# Patient Record
Sex: MALE | URBAN - NONMETROPOLITAN AREA
[De-identification: names, ages, dates, MRNs, and addresses within clinical notes are randomized per-mention and may not be internally consistent; named-entity substitution may affect disease eponyms.]

---

## 2020-08-29 ENCOUNTER — HISTORICAL (OUTPATIENT)
Dept: ADMINISTRATIVE | Facility: HOSPITAL | Age: 65
End: 2020-08-29

## 2022-09-19 DIAGNOSIS — D72.829 ELEVATED WHITE BLOOD CELL COUNT, UNSPECIFIED: Primary | ICD-10-CM

## 2022-11-08 DIAGNOSIS — D72.829 ELEVATED WHITE BLOOD CELL COUNT, UNSPECIFIED: Primary | ICD-10-CM

## 2022-11-09 ENCOUNTER — OFFICE VISIT (OUTPATIENT)
Dept: UROLOGY | Facility: CLINIC | Age: 67
End: 2022-11-09
Payer: MEDICARE

## 2022-11-09 VITALS — HEIGHT: 69 IN | BODY MASS INDEX: 45.91 KG/M2 | RESPIRATION RATE: 20 BRPM | WEIGHT: 310 LBS

## 2022-11-09 DIAGNOSIS — D72.829 ELEVATED WHITE BLOOD CELL COUNT, UNSPECIFIED: ICD-10-CM

## 2022-11-09 DIAGNOSIS — N50.812 PAIN IN LEFT TESTICLE: ICD-10-CM

## 2022-11-09 DIAGNOSIS — R35.1 NOCTURIA: ICD-10-CM

## 2022-11-09 DIAGNOSIS — R97.20 ELEVATED PSA: Primary | ICD-10-CM

## 2022-11-09 LAB — POC RESIDUAL URINE VOLUME: 16 ML (ref 0–100)

## 2022-11-09 PROCEDURE — 51798 US URINE CAPACITY MEASURE: CPT | Mod: S$GLB,,, | Performed by: NURSE PRACTITIONER

## 2022-11-09 PROCEDURE — 99204 OFFICE O/P NEW MOD 45 MIN: CPT | Mod: S$GLB,,, | Performed by: NURSE PRACTITIONER

## 2022-11-09 PROCEDURE — 51798 POCT BLADDER SCAN: ICD-10-PCS | Mod: S$GLB,,, | Performed by: NURSE PRACTITIONER

## 2022-11-09 PROCEDURE — 99204 PR OFFICE/OUTPT VISIT, NEW, LEVL IV, 45-59 MIN: ICD-10-PCS | Mod: S$GLB,,, | Performed by: NURSE PRACTITIONER

## 2022-11-09 RX ORDER — ASPIRIN 81 MG/1
81 TABLET ORAL DAILY
COMMUNITY

## 2022-11-09 RX ORDER — ROSUVASTATIN CALCIUM 40 MG/1
40 TABLET, COATED ORAL NIGHTLY
COMMUNITY
Start: 2022-09-29

## 2022-11-09 RX ORDER — FUROSEMIDE 20 MG/1
20 TABLET ORAL DAILY
COMMUNITY
Start: 2022-09-06

## 2022-11-09 RX ORDER — METOPROLOL TARTRATE 25 MG/1
25 TABLET, FILM COATED ORAL 2 TIMES DAILY
COMMUNITY
Start: 2022-10-01

## 2022-11-09 RX ORDER — LOSARTAN POTASSIUM 100 MG/1
100 TABLET ORAL DAILY
COMMUNITY
Start: 2022-07-03

## 2022-11-09 RX ORDER — HYDROXYCHLOROQUINE SULFATE 200 MG/1
200 TABLET, FILM COATED ORAL 2 TIMES DAILY
COMMUNITY
Start: 2022-10-21

## 2022-11-09 RX ORDER — TAMSULOSIN HYDROCHLORIDE 0.4 MG/1
CAPSULE ORAL
COMMUNITY
Start: 2022-09-29

## 2022-11-09 RX ORDER — AMLODIPINE BESYLATE 10 MG/1
TABLET ORAL
COMMUNITY
Start: 2022-11-08

## 2022-11-09 RX ORDER — TICAGRELOR 90 MG/1
90 TABLET ORAL 2 TIMES DAILY
COMMUNITY
Start: 2022-10-16 | End: 2023-03-29

## 2022-11-09 NOTE — PROGRESS NOTES
"Subjective:       Patient ID: Evens Briseno is a 67 y.o. male.    Chief Complaint: New Pt and Elevated PSA      HPI: 67-year-old male, new to [facility], referred for an elevated PSA.    Patient's PSA on 09/14/2022.  PSA at that time was 4.74.    Patient denies any history of elevated PSAs.  Denies any family history of prostate cancer.      Patient denies any pain or burning urination.  Does have occasional nocturia.    Denies having strain to void.  Denies any significant frequency urgency.  Denies any leakage.  Denies any blood in urine.  Denies any fever or body aches.  Denies any significant weight loss.  Denies any onset bone pain.      Patient does complain of left testicular pain.  Patient states with this been going on for "years".  Pain can be mild to moderate.    No other urinary complaints at this time.       Past Medical History:   Past Medical History:   Diagnosis Date    BPH with obstruction/lower urinary tract symptoms     Elevated PSA     HLD (hyperlipidemia)     HTN (hypertension)     MI (myocardial infarction)     Skin cancer        Past Surgical Historical:   Past Surgical History:   Procedure Laterality Date    PERIPHERAL ARTERIAL STENT GRAFT      WISDOM TOOTH EXTRACTION          Medications:   Medication List with Changes/Refills   Current Medications    AMLODIPINE (NORVASC) 10 MG TABLET        ASPIRIN (ECOTRIN) 81 MG EC TABLET    Take 81 mg by mouth once daily.    BRILINTA 90 MG TABLET    Take 90 mg by mouth 2 (two) times daily.    FUROSEMIDE (LASIX) 20 MG TABLET    Take 20 mg by mouth once daily.    HYDROXYCHLOROQUINE (PLAQUENIL) 200 MG TABLET    Take 200 mg by mouth 2 (two) times daily.    LOSARTAN (COZAAR) 100 MG TABLET    Take 100 mg by mouth once daily.    METOPROLOL TARTRATE (LOPRESSOR) 25 MG TABLET    Take 25 mg by mouth 2 (two) times daily.    ROSUVASTATIN (CRESTOR) 40 MG TAB    Take 40 mg by mouth every evening.    TAMSULOSIN (FLOMAX) 0.4 MG CAP    TAKE 1 CAPSULE BY MOUTH ONCE " A DAY TAKE 1 CAPSULE BY MOUTH EVERY DAY        Past Social History:   Social History     Socioeconomic History    Marital status: Unknown   Tobacco Use    Smoking status: Every Day     Types: Cigarettes    Smokeless tobacco: Never   Substance and Sexual Activity    Alcohol use: Not Currently       Allergies: Review of patient's allergies indicates:  No Known Allergies     Family History: History reviewed. No pertinent family history.     Review of Systems:  Review of Systems   Constitutional:  Negative for activity change and appetite change.   HENT:  Negative for congestion and dental problem.    Eyes:  Negative for visual disturbance.   Respiratory:  Negative for chest tightness and shortness of breath.    Cardiovascular:  Negative for chest pain.   Gastrointestinal:  Negative for abdominal distention and abdominal pain.   Genitourinary:  Positive for testicular pain. Negative for decreased urine volume, difficulty urinating, dysuria, enuresis, flank pain, frequency, genital sores, hematuria, penile discharge, penile pain, penile swelling, scrotal swelling and urgency.   Musculoskeletal:  Negative for back pain and neck pain.   Skin:  Negative for color change.   Neurological:  Negative for dizziness.   Hematological:  Negative for adenopathy.   Psychiatric/Behavioral:  Negative for agitation, behavioral problems and confusion.      Physical Exam:  Physical Exam  Vitals and nursing note reviewed.   Constitutional:       Appearance: He is well-developed.   HENT:      Head: Normocephalic.   Eyes:      Pupils: Pupils are equal, round, and reactive to light.   Cardiovascular:      Rate and Rhythm: Normal rate and regular rhythm.      Heart sounds: Normal heart sounds.   Pulmonary:      Effort: Pulmonary effort is normal.      Breath sounds: Normal breath sounds.   Abdominal:      General: Bowel sounds are normal.      Palpations: Abdomen is soft.   Genitourinary:     Penis: Normal.       Testes:         Left: Swelling  present.      Prostate: Normal.      Rectum: Normal.      Comments: Prostate is benign.  Prostate smooth no nodules and nontender.  Prostate is symmetrical.    Mild edema noted to left testicle.  Likely hydrocele.  Musculoskeletal:         General: Normal range of motion.      Cervical back: Normal range of motion and neck supple.   Skin:     General: Skin is warm and dry.   Neurological:      Mental Status: He is alert and oriented to person, place, and time.   Psychiatric:         Behavior: Behavior normal.     Bladder scan: 16 cc    Assessment/Plan:   1. Elevated PSA:  Will check the patient's PSA.  We will notify him of the results.    If PSA is still elevated will recommend biopsy.    If PSA has decrease into the normal range will plan to repeat at next visit.      2. Nocturia:  Patient has approximately 1-2 episodes of nocturia per night.    He does admit to drinking two 32 oz of sweet tea per day.    We discussed the effects of tea, soda, and coffee.  Patient will try to decrease his tea consumption.      3. Pain in left testicle:  Will schedule patient for ultrasound for further evaluation.      Follow-up to arrange.  Problem List Items Addressed This Visit    None  Visit Diagnoses       Elevated PSA    -  Primary    Relevant Orders    Prostate Specific Antigen, Diagnostic    Nocturia        Pain in left testicle        Relevant Orders    US Scrotum And Testicles

## 2022-11-10 LAB — PSA, DIAGNOSTIC: 4.85 NG/ML (ref 0–4)

## 2022-11-11 ENCOUNTER — TELEPHONE (OUTPATIENT)
Dept: UROLOGY | Facility: CLINIC | Age: 67
End: 2022-11-11
Payer: MEDICARE

## 2022-11-11 NOTE — TELEPHONE ENCOUNTER
----- Message from Matti Vasquez NP sent at 11/10/2022 12:19 PM CST -----  PSA is elevated and slightly higher than when checked by his PCP.  Recommend biopsy.

## 2022-11-11 NOTE — TELEPHONE ENCOUNTER
Notified pt of results and that provider recommended a bx. Pt stated they would like to follow thru with the bx. Notified them that they would hear something from [facility] once we have insurance authorization. ED

## 2022-12-09 ENCOUNTER — TELEPHONE (OUTPATIENT)
Dept: UROLOGY | Facility: CLINIC | Age: 67
End: 2022-12-09
Payer: MEDICARE

## 2022-12-09 DIAGNOSIS — R97.20 ELEVATED PSA: Primary | ICD-10-CM

## 2022-12-09 NOTE — TELEPHONE ENCOUNTER
Pt called with questions regarding biopsy. All questions were answered. I have placed order for biopsy.

## 2022-12-13 ENCOUNTER — TELEPHONE (OUTPATIENT)
Dept: UROLOGY | Facility: CLINIC | Age: 67
End: 2022-12-13
Payer: MEDICARE

## 2022-12-13 NOTE — TELEPHONE ENCOUNTER
----- Message from Matti Vasquez NP sent at 12/8/2022  4:39 PM CST -----  Ultrasound shows small left hydrocele and small bilateral epididymal head cysts.  Also a small simple cyst in the right testicle.  All benign findings.  Patient encouraged to wear supportive underwear.      Follow-up in 6 months, sooner if needed.

## 2022-12-15 ENCOUNTER — TELEPHONE (OUTPATIENT)
Dept: UROLOGY | Facility: CLINIC | Age: 67
End: 2022-12-15
Payer: MEDICARE

## 2022-12-16 NOTE — TELEPHONE ENCOUNTER
Pt had questions regarding biopsy. All questions answered. Advised pt to write down anything he can think of and we can answer for him when he comes for consents/education. Pt verbalized understanding.     ----- Message from Darby Chino sent at 12/16/2022 10:00 AM CST -----  Pt has finally set up bx but has some questions and would like a call back from a nurse

## 2023-01-27 DIAGNOSIS — R97.20 ELEVATED PSA: Primary | ICD-10-CM

## 2023-01-27 RX ORDER — CIPROFLOXACIN 500 MG/1
500 TABLET ORAL 2 TIMES DAILY
Qty: 8 TABLET | Refills: 0 | Status: SHIPPED | OUTPATIENT
Start: 2023-01-27 | End: 2023-01-31

## 2023-01-27 RX ORDER — DIAZEPAM 10 MG/1
TABLET ORAL
Qty: 1 TABLET | Refills: 0 | Status: SHIPPED | OUTPATIENT
Start: 2023-01-27

## 2023-01-30 ENCOUNTER — CLINICAL SUPPORT (OUTPATIENT)
Dept: UROLOGY | Facility: CLINIC | Age: 68
End: 2023-01-30
Payer: MEDICARE

## 2023-01-30 DIAGNOSIS — R97.20 ELEVATED PSA: Primary | ICD-10-CM

## 2023-01-30 NOTE — PROGRESS NOTES
Patient educated, consent signed, pre-admission paperwork given. Patient verbalized understanding. DOS 2/14/23 cipro and valium script given to patient. Patient will need cardiac clearance from vilma leigh NP. Radha notified. Patient aware if we do not receive clearance we will have to postpone procedure.  [facility]

## 2023-02-13 ENCOUNTER — TELEPHONE (OUTPATIENT)
Dept: UROLOGY | Facility: CLINIC | Age: 68
End: 2023-02-13
Payer: MEDICARE

## 2023-02-13 NOTE — TELEPHONE ENCOUNTER
Appointment confirmed. Arrival time of 9:30 AM given. Instructed to take valium 30 minutes prior to arrival, begin cipro today, administer enema 2 hrs prior to procedure & to have a  with him. Verbalized understanding.     ZHAO Vazquez RN

## 2023-02-14 ENCOUNTER — PROCEDURE VISIT (OUTPATIENT)
Dept: UROLOGY | Facility: CLINIC | Age: 68
End: 2023-02-14
Payer: MEDICARE

## 2023-02-14 VITALS
WEIGHT: 305.13 LBS | HEART RATE: 71 BPM | BODY MASS INDEX: 45.19 KG/M2 | HEIGHT: 69 IN | RESPIRATION RATE: 20 BRPM | SYSTOLIC BLOOD PRESSURE: 128 MMHG | DIASTOLIC BLOOD PRESSURE: 57 MMHG | OXYGEN SATURATION: 95 %

## 2023-02-14 DIAGNOSIS — R97.20 ELEVATED PSA: ICD-10-CM

## 2023-02-14 PROCEDURE — 76872 TRANSRECTAL ULTRASOUND W/ BIOPSY: ICD-10-PCS | Mod: S$GLB,,, | Performed by: UROLOGY

## 2023-02-14 PROCEDURE — 55700 TRANSRECTAL ULTRASOUND W/ BIOPSY: CPT | Mod: S$GLB,,, | Performed by: UROLOGY

## 2023-02-14 PROCEDURE — 76872 US TRANSRECTAL: CPT | Mod: S$GLB,,, | Performed by: UROLOGY

## 2023-02-14 PROCEDURE — 55700 TRANSRECTAL ULTRASOUND W/ BIOPSY: ICD-10-PCS | Mod: S$GLB,,, | Performed by: UROLOGY

## 2023-02-14 NOTE — PROCEDURES
"Transrectal Ultrasound w/ Biopsy    Date/Time: 2/14/2023 10:00 AM  Performed by: Vitaliy Swenson MD  Authorized by: Vitaliy Swenson MD     Consent Done?:  Yes (Written)  Time out: Immediately prior to procedure a "time out" was called to verify the correct patient, procedure, equipment, support staff and site/side marked as required.    Indications: Elevated PSA    Position:  Left lateral  Anesthesia:  Pudendal nerve block  Patient sedated: No    Prostate Size:  118g  Left Base Biopsies: 2  Left Mid Biopsies: 2  Left Center Biopsies: 2  Right Base Biopsies: 2  Right Mid Biopsies: 2  Right Center Biopsies: 2  Total Biopsies:  12    Patient tolerance:  Patient tolerated the procedure well with no immediate complications     Patient was brought to the procedure room placed on the table in left lateral decubitus position lidocaine jelly was instilled into the rectum the ultrasound probe was advanced to level of prostate and a total of 10 cc of lidocaine was injected into the bilateral kalani prostatic fossa.  A standard 12 core prostate biopsy was obtained patient tolerated the procedure well there were no complications  "

## 2023-02-14 NOTE — PATIENT INSTRUCTIONS
NO STRENUOUS ACTIVITY FOR 3 DAYS  NO SEXUAL INTERCOURSE FOR 7 DAYS  YOU MAY NOTICE BLOOD IN URINE OR SEMEN FOR SEVERAL DAYS                What to Expect After a Prostate Biopsy    Please be sure to finish your pre-procedure antibiotics as instructed.    You may have mild bleeding from the rectum or urine for about 1 week to 1 month, or in your ejaculate for several months. This bleeding is normal and expected, and it will stop. You may have mild discomfort in your rectal or urethral area for 24-48 hours.    You cannot do any strenuous lifting, straining, or exercising for 24 hours. You may return to full activity the day after the biopsy.    You may continue to take all your regular medications after the procedure except for the blood thinners.    You may resume all blood-thinning medications once you no longer see any bleeding or whenever your physician prescribing the medication says it is all right to do so. You may take Tylenol if you have a fever and your temperature is less than 100° F or if you have some discomfort.    You will receive a call from the Urology Department at [facility] with the results of your prostate biopsy within one week.    Signs and Symptoms to Report    Call your [facility] urologist at [phone] if you develop any of the following:  Temperature greater than 101°  F  Inability to urinate  A large amount of bleeding from the rectum or in the urine  Persistent or severe pain    After hours or on weekends, you may reach a urology resident on call at this number: [phone].

## 2023-02-27 ENCOUNTER — TELEPHONE (OUTPATIENT)
Dept: UROLOGY | Facility: CLINIC | Age: 68
End: 2023-02-27
Payer: MEDICARE

## 2023-03-28 ENCOUNTER — TELEPHONE (OUTPATIENT)
Dept: UROLOGY | Facility: CLINIC | Age: 68
End: 2023-03-28
Payer: MEDICARE

## 2023-03-28 NOTE — TELEPHONE ENCOUNTER
----- Message from Matti Vasquez NP sent at 3/27/2023  4:17 PM CDT -----  Contact: self  He can follow up in 3 months or sooner if needed.    ----- Message -----  From: Kristen Moore LPN  Sent: 3/27/2023   4:08 PM CDT  To: Matti Vasquez NP    Both actually. He has only been seen by you once for elevated PSA and a biopsy with Dr. Swenson.   ----- Message -----  From: Matti Vasquez NP  Sent: 3/27/2023   4:00 PM CDT  To: Kristen Moore LPN    When you say us, do you mean the only time he saw Dr. Swenson was for a biopsy.  Or do you mean us as in the only time he was seen in the Urology Clinic was for his elevated PSA?    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 How would you like to proceed?  ----- Message -----  From: Dayanna Hicks  Sent: 3/27/2023  11:16 AM CDT  To: Messi Burks Staff    Requesting a call back regarding getting a release from Dr Swenson - his GP wants to do some hormone shots and needed to make sure it'd be okay with Dr Swenson. Please call back at [phone]

## 2023-03-28 NOTE — TELEPHONE ENCOUNTER
Discussed call with Jessie in person, he recommends getting pt in for appt to discuss testosterone and the risk for prostatae cancer. Informed pt and scheduled him for tomorrow in [place]. There was an opening and pt agreed with date/time and was aware appt is in [place]. [facility]

## 2023-03-29 ENCOUNTER — OFFICE VISIT (OUTPATIENT)
Dept: UROLOGY | Facility: CLINIC | Age: 68
End: 2023-03-29
Payer: MEDICARE

## 2023-03-29 DIAGNOSIS — N40.1 BPH WITH URINARY OBSTRUCTION: ICD-10-CM

## 2023-03-29 DIAGNOSIS — R97.20 ELEVATED PSA: Primary | ICD-10-CM

## 2023-03-29 DIAGNOSIS — N13.8 BPH WITH URINARY OBSTRUCTION: ICD-10-CM

## 2023-03-29 DIAGNOSIS — R33.9 INCOMPLETE BLADDER EMPTYING: ICD-10-CM

## 2023-03-29 DIAGNOSIS — R53.83 FATIGUE, UNSPECIFIED TYPE: ICD-10-CM

## 2023-03-29 PROCEDURE — 99214 PR OFFICE/OUTPT VISIT, EST, LEVL IV, 30-39 MIN: ICD-10-PCS | Mod: S$GLB,,, | Performed by: NURSE PRACTITIONER

## 2023-03-29 PROCEDURE — 99214 OFFICE O/P EST MOD 30 MIN: CPT | Mod: S$GLB,,, | Performed by: NURSE PRACTITIONER

## 2023-03-29 NOTE — PROGRESS NOTES
Subjective:       Patient ID: Evens Briseno is a 67 y.o. male.    Chief Complaint: discuss testosterone      HPI: 67-year-old male, established patient, presents for follow-up prostate biopsy.    Patient a PSA of 4.85.  He underwent a biopsy on 02/14/2023.  All 12 samples were benign.    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    Patient has history BPH with obstruction.  He is maintained on Flomax 0.4 mg daily.  Patient denies any pain or burning urination.  Denies any difficulty voiding.  States he is a pretty good stream from start to finish.  Says he may have episodes where after voiding he will have to go back and void again.  Denies any significant urgency or nocturia.  Does admit to drinking a lot of tea.  Denies coffee or soda use.      Patient reports he has low testosterone.  States his PCP checked his testosterone and it is low with recommendation of testosterone treatment.    Patient admits he does have low energy level.  He also has low libido and erectile dysfunction.      No blood in urine.  Denies significant weight loss.  No new onset bone pain.    No other urinary complaints this time.       Past Medical History:   Past Medical History:   Diagnosis Date    BPH with obstruction/lower urinary tract symptoms     Elevated PSA     HLD (hyperlipidemia)     HTN (hypertension)     MI (myocardial infarction)     Skin cancer        Past Surgical Historical:   Past Surgical History:   Procedure Laterality Date    PERIPHERAL ARTERIAL STENT GRAFT      WISDOM TOOTH EXTRACTION          Medications:   Medication List with Changes/Refills   Current Medications    AMLODIPINE (NORVASC) 10 MG TABLET        ASPIRIN (ECOTRIN) 81 MG EC TABLET    Take 81 mg by mouth once daily.    DIAZEPAM (VALIUM) 10 MG TAB    TAKE 1 TABLET PO 30 MINUTES PRIOR TO ARRIVAL FOR PROCEDURE    FUROSEMIDE (LASIX) 20 MG TABLET    Take 20 mg by mouth once daily.    HYDROXYCHLOROQUINE (PLAQUENIL) 200 MG TABLET    Take 200 mg by mouth 2 (two) times daily.     LOSARTAN (COZAAR) 100 MG TABLET    Take 100 mg by mouth once daily.    METOPROLOL TARTRATE (LOPRESSOR) 25 MG TABLET    Take 25 mg by mouth 2 (two) times daily.    ROSUVASTATIN (CRESTOR) 40 MG TAB    Take 40 mg by mouth every evening.    TAMSULOSIN (FLOMAX) 0.4 MG CAP    TAKE 1 CAPSULE BY MOUTH ONCE A DAY TAKE 1 CAPSULE BY MOUTH EVERY DAY   Discontinued Medications    BRILINTA 90 MG TABLET    Take 90 mg by mouth 2 (two) times daily.        Past Social History:   Social History     Socioeconomic History    Marital status: Unknown   Tobacco Use    Smoking status: Every Day     Types: Cigarettes    Smokeless tobacco: Never   Substance and Sexual Activity    Alcohol use: Not Currently       Allergies: Review of patient's allergies indicates:  No Known Allergies     Family History: History reviewed. No pertinent family history.     Review of Systems:  Review of Systems   Constitutional:  Negative for activity change and appetite change.   HENT:  Negative for congestion and dental problem.    Eyes:  Negative for visual disturbance.   Respiratory:  Negative for chest tightness and shortness of breath.    Cardiovascular:  Negative for chest pain.   Gastrointestinal:  Negative for abdominal distention and abdominal pain.   Genitourinary:  Negative for decreased urine volume, difficulty urinating, dysuria, enuresis, flank pain, frequency, genital sores, hematuria, penile discharge, penile pain, penile swelling, scrotal swelling, testicular pain and urgency.   Musculoskeletal:  Negative for back pain and neck pain.   Skin:  Negative for color change.   Neurological:  Negative for dizziness.   Hematological:  Negative for adenopathy.   Psychiatric/Behavioral:  Negative for agitation, behavioral problems and confusion.      Physical Exam:  Physical Exam  Vitals and nursing note reviewed.   Constitutional:       Appearance: He is well-developed.   HENT:      Head: Normocephalic.   Eyes:      Pupils: Pupils are equal, round, and  reactive to light.   Cardiovascular:      Rate and Rhythm: Normal rate and regular rhythm.      Heart sounds: Normal heart sounds.   Pulmonary:      Effort: Pulmonary effort is normal.      Breath sounds: Normal breath sounds.   Abdominal:      General: Bowel sounds are normal.      Palpations: Abdomen is soft.   Musculoskeletal:         General: Normal range of motion.      Cervical back: Normal range of motion and neck supple.   Skin:     General: Skin is warm and dry.   Neurological:      Mental Status: He is alert and oriented to person, place, and time.   Psychiatric:         Behavior: Behavior normal.     Bladder scan: 118 cc    Assessment/Plan:   1. Elevated PSA:  Check a PSA of 4.85 with biopsy in February 2023.  One sample showed HGPIN.  No cancer identified.      2. BPH with obstruction/incomplete bladder emptying:  Patient bladder scan is elevated 118 cc.    He denies any significant complaints.  Did discuss the effects of tea.  Also discussed decreasing evening fluids.    Patient continue Flomax as directed.      3. Fatigue:  Patient reports hypogonadism.  His primary care doctor would like to start the patient on testosterone.    Did discuss with the patient the risk of testosterone treatment.    Testosterone does not cause prostate cancer.  However, if prostate cancer is present testosterone can exacerbate prostate cancer growth.    To start treatment can also cause a rise in PSAs.   Patient does have elevated PSA with a negative biopsy.  Due to his history of elevated PSA he is at mild risk.    Did discuss whether he is on testosterone treatment or not that we will routinely monitor his PSAs for any changes.    Plan follow-up in 6 months, sooner if needed.  Problem List Items Addressed This Visit    None  Visit Diagnoses       Elevated PSA    -  Primary    BPH with urinary obstruction        Incomplete bladder emptying

## 2023-05-29 ENCOUNTER — CLINICAL SUPPORT (OUTPATIENT)
Dept: UROLOGY | Facility: CLINIC | Age: 68
End: 2023-05-29
Payer: MEDICARE

## 2023-05-29 DIAGNOSIS — R97.20 ELEVATED PSA: Primary | ICD-10-CM

## 2023-05-29 LAB — PSA, DIAGNOSTIC: 4.97 NG/ML (ref 0.1–4)

## 2023-05-29 NOTE — PROGRESS NOTES
PSA level collected from right AC with 22g needle using aseptic technique. Patient tolerated well.

## 2023-06-06 ENCOUNTER — OFFICE VISIT (OUTPATIENT)
Dept: UROLOGY | Facility: CLINIC | Age: 68
End: 2023-06-06
Payer: MEDICARE

## 2023-06-06 VITALS — HEART RATE: 78 BPM | DIASTOLIC BLOOD PRESSURE: 67 MMHG | RESPIRATION RATE: 19 BRPM | SYSTOLIC BLOOD PRESSURE: 149 MMHG

## 2023-06-06 DIAGNOSIS — R97.20 ELEVATED PSA: Primary | ICD-10-CM

## 2023-06-06 PROCEDURE — 99214 OFFICE O/P EST MOD 30 MIN: CPT | Mod: S$GLB,,, | Performed by: UROLOGY

## 2023-06-06 PROCEDURE — 99214 PR OFFICE/OUTPT VISIT, EST, LEVL IV, 30-39 MIN: ICD-10-PCS | Mod: S$GLB,,, | Performed by: UROLOGY

## 2023-06-06 NOTE — PROGRESS NOTES
Subjective:       Patient ID: Evens Briseno is a 68 y.o. male.    Chief Complaint: 3 month psa      HPI: 68-year-old male patient following up with a three-month PSA.  His PSA at visit was 4.97.  Previously it was 4.85 on 11/09/2022.  Patient has had a biopsy on 02/14/2023.  All 12 samples were benign with 1/12 sample showing HGPIN.  Patient currently taking Flomax 0.4 mg daily.  He denies any issues at this visit.       Past Medical History:   Past Medical History:   Diagnosis Date    BPH with obstruction/lower urinary tract symptoms     Elevated PSA     HLD (hyperlipidemia)     HTN (hypertension)     MI (myocardial infarction)     Skin cancer        Past Surgical Historical:   Past Surgical History:   Procedure Laterality Date    PERIPHERAL ARTERIAL STENT GRAFT      WISDOM TOOTH EXTRACTION          Medications:   Medication List with Changes/Refills   Current Medications    AMLODIPINE (NORVASC) 10 MG TABLET        ASPIRIN (ECOTRIN) 81 MG EC TABLET    Take 81 mg by mouth once daily.    DIAZEPAM (VALIUM) 10 MG TAB    TAKE 1 TABLET PO 30 MINUTES PRIOR TO ARRIVAL FOR PROCEDURE    FUROSEMIDE (LASIX) 20 MG TABLET    Take 20 mg by mouth once daily.    HYDROXYCHLOROQUINE (PLAQUENIL) 200 MG TABLET    Take 200 mg by mouth 2 (two) times daily.    LOSARTAN (COZAAR) 100 MG TABLET    Take 100 mg by mouth once daily.    METOPROLOL TARTRATE (LOPRESSOR) 25 MG TABLET    Take 25 mg by mouth 2 (two) times daily.    ROSUVASTATIN (CRESTOR) 40 MG TAB    Take 40 mg by mouth every evening.    TAMSULOSIN (FLOMAX) 0.4 MG CAP    TAKE 1 CAPSULE BY MOUTH ONCE A DAY TAKE 1 CAPSULE BY MOUTH EVERY DAY        Past Social History:   Social History     Socioeconomic History    Marital status: Unknown   Tobacco Use    Smoking status: Every Day     Types: Cigarettes    Smokeless tobacco: Never   Substance and Sexual Activity    Alcohol use: Not Currently       Allergies: Review of patient's allergies indicates:  No Known Allergies     Family  History: History reviewed. No pertinent family history.     Review of Systems:  Review of Systems   Constitutional: Negative.    HENT: Negative.     Eyes: Negative.    Respiratory: Negative.     Cardiovascular: Negative.    Gastrointestinal: Negative.    Endocrine: Negative.    Genitourinary: Negative.    Musculoskeletal: Negative.    Skin: Negative.    Allergic/Immunologic: Negative.    Neurological: Negative.    Hematological: Negative.    Psychiatric/Behavioral: Negative.       Physical Exam:  Physical Exam  Constitutional:       Appearance: He is normal weight.   HENT:      Head: Normocephalic.      Nose: Nose normal.      Mouth/Throat:      Mouth: Mucous membranes are moist.      Pharynx: Oropharynx is clear.   Eyes:      Extraocular Movements: Extraocular movements intact.      Conjunctiva/sclera: Conjunctivae normal.      Pupils: Pupils are equal, round, and reactive to light.   Cardiovascular:      Rate and Rhythm: Normal rate and regular rhythm.      Pulses: Normal pulses.      Heart sounds: Normal heart sounds.   Pulmonary:      Effort: Pulmonary effort is normal.      Breath sounds: Normal breath sounds.   Abdominal:      General: Abdomen is flat. Bowel sounds are normal.      Palpations: Abdomen is soft.      Hernia: There is no hernia in the right inguinal area or left inguinal area.   Genitourinary:     Penis: Normal. No phimosis, paraphimosis, hypospadias, erythema, tenderness or discharge.       Testes: Normal.         Right: Mass, tenderness or swelling not present. Right testis is descended. Cremasteric reflex is present.          Left: Mass, tenderness or swelling not present. Left testis is descended. Cremasteric reflex is present.       Prostate: Normal.      Rectum: Normal.   Musculoskeletal:         General: Normal range of motion.      Cervical back: Normal range of motion and neck supple.   Lymphadenopathy:      Lower Body: No right inguinal adenopathy. No left inguinal adenopathy.   Skin:      General: Skin is warm and dry.      Capillary Refill: Capillary refill takes less than 2 seconds.   Neurological:      General: No focal deficit present.      Mental Status: He is alert and oriented to person, place, and time. Mental status is at baseline.   Psychiatric:         Mood and Affect: Mood normal.         Behavior: Behavior normal.         Thought Content: Thought content normal.         Judgment: Judgment normal.       Assessment/Plan:       Very slight elevation of PSA from 4.85 to 4.97 at this three-month follow up.  We will recheck the patient's PSA in 6 months to determine next step and plan of care.  We discussed possible prostate MRI if he is experiencing elevation.     I, Dr. Vitaliy Swenson have seen and personally evaluated the patient. I have formulated the plan reviewed all pertinent imaging and clinical data.  I agree with the nurse practitioner's assessment, and I have personally formulated the plan for this patient's care as described by the midlevel.    Problem List Items Addressed This Visit    None

## 2023-06-08 ENCOUNTER — PATIENT MESSAGE (OUTPATIENT)
Dept: RESEARCH | Facility: HOSPITAL | Age: 68
End: 2023-06-08
Payer: MEDICARE

## 2023-07-24 ENCOUNTER — PATIENT MESSAGE (OUTPATIENT)
Dept: RESEARCH | Facility: HOSPITAL | Age: 68
End: 2023-07-24
Payer: MEDICARE

## 2023-07-31 ENCOUNTER — PATIENT MESSAGE (OUTPATIENT)
Dept: RESEARCH | Facility: HOSPITAL | Age: 68
End: 2023-07-31
Payer: MEDICARE

## 2023-12-01 ENCOUNTER — TELEPHONE (OUTPATIENT)
Dept: UROLOGY | Facility: CLINIC | Age: 68
End: 2023-12-01
Payer: MEDICARE

## 2023-12-01 NOTE — TELEPHONE ENCOUNTER
Notified patient of upcoming PSA blood draw on Monday, December 4th at 8 AM & f/u with Dr. Swenson on Tuesday, December 5th at 9:40 AM. Verbalized understanding.                       ----- Message from Maria Ines Bowen sent at 12/1/2023 10:06 AM CST -----  Contact: self  Type:  Patient Returning Call    Who Called:Evens Briseno  Who Left Message for Patient:unsure  Does the patient know what this is regarding?:upcoming  Would the patient rather a call back or a response via [facility]?   Best Call Back Number:[phone]  Additional Information: n/a

## 2023-12-04 ENCOUNTER — CLINICAL SUPPORT (OUTPATIENT)
Dept: UROLOGY | Facility: CLINIC | Age: 68
End: 2023-12-04
Payer: MEDICARE

## 2023-12-04 DIAGNOSIS — R97.20 ELEVATED PSA: Primary | ICD-10-CM

## 2023-12-04 LAB — PSA, DIAGNOSTIC: 5.94 NG/ML (ref 0.1–4)

## 2023-12-04 PROCEDURE — 36415 PR COLLECTION VENOUS BLOOD,VENIPUNCTURE: ICD-10-PCS | Mod: S$GLB,,, | Performed by: UROLOGY

## 2023-12-04 PROCEDURE — 36415 COLL VENOUS BLD VENIPUNCTURE: CPT | Mod: S$GLB,,, | Performed by: UROLOGY

## 2023-12-05 ENCOUNTER — OFFICE VISIT (OUTPATIENT)
Dept: UROLOGY | Facility: CLINIC | Age: 68
End: 2023-12-05
Payer: MEDICARE

## 2023-12-05 VITALS — HEART RATE: 78 BPM | DIASTOLIC BLOOD PRESSURE: 60 MMHG | SYSTOLIC BLOOD PRESSURE: 149 MMHG | RESPIRATION RATE: 18 BRPM

## 2023-12-05 DIAGNOSIS — R97.20 ELEVATED PSA: Primary | ICD-10-CM

## 2023-12-05 DIAGNOSIS — N13.8 BPH WITH URINARY OBSTRUCTION: ICD-10-CM

## 2023-12-05 DIAGNOSIS — N40.1 BPH WITH URINARY OBSTRUCTION: ICD-10-CM

## 2023-12-05 PROCEDURE — 99214 PR OFFICE/OUTPT VISIT, EST, LEVL IV, 30-39 MIN: ICD-10-PCS | Mod: S$GLB,,, | Performed by: UROLOGY

## 2023-12-05 PROCEDURE — 99214 OFFICE O/P EST MOD 30 MIN: CPT | Mod: S$GLB,,, | Performed by: UROLOGY

## 2023-12-05 NOTE — PROGRESS NOTES
Subjective:       Patient ID: Evens Briseno is a 68 y.o. male.    Chief Complaint: 6 month psa      HPI: 68-year-old male patient presenting to the clinic to follow up for elevated PSA.  Patient's most recent PSA on 12/04/2023 was 5.94.  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  Patient currently taking Flomax 0.4 mg daily.  He denies any issues at this visit.    12/04/2023   5.94  05/29/2023   4.97  11/29/2022   4.85       Past Medical History:   Past Medical History:   Diagnosis Date    BPH with obstruction/lower urinary tract symptoms     Elevated PSA     HLD (hyperlipidemia)     HTN (hypertension)     MI (myocardial infarction)     Skin cancer        Past Surgical Historical:   Past Surgical History:   Procedure Laterality Date    PERIPHERAL ARTERIAL STENT GRAFT      WISDOM TOOTH EXTRACTION          Medications:   Medication List with Changes/Refills   Current Medications    AMLODIPINE (NORVASC) 10 MG TABLET        ASPIRIN (ECOTRIN) 81 MG EC TABLET    Take 81 mg by mouth once daily.    DIAZEPAM (VALIUM) 10 MG TAB    TAKE 1 TABLET PO 30 MINUTES PRIOR TO ARRIVAL FOR PROCEDURE    FUROSEMIDE (LASIX) 20 MG TABLET    Take 20 mg by mouth once daily.    HYDROXYCHLOROQUINE (PLAQUENIL) 200 MG TABLET    Take 200 mg by mouth 2 (two) times daily.    LOSARTAN (COZAAR) 100 MG TABLET    Take 100 mg by mouth once daily.    METOPROLOL TARTRATE (LOPRESSOR) 25 MG TABLET    Take 25 mg by mouth 2 (two) times daily.    ROSUVASTATIN (CRESTOR) 40 MG TAB    Take 40 mg by mouth every evening.    TAMSULOSIN (FLOMAX) 0.4 MG CAP    TAKE 1 CAPSULE BY MOUTH ONCE A DAY TAKE 1 CAPSULE BY MOUTH EVERY DAY        Past Social History:   Social History     Socioeconomic History    Marital status: Unknown   Tobacco Use    Smoking status: Every Day     Types: Cigarettes    Smokeless tobacco: Never   Substance and Sexual Activity    Alcohol use: Not Currently       Allergies: Review of patient's  allergies indicates:  No Known Allergies     Family History: History reviewed. No pertinent family history.     Review of Systems:  Review of Systems   Constitutional: Negative.    HENT: Negative.     Eyes: Negative.    Respiratory: Negative.     Cardiovascular: Negative.    Gastrointestinal: Negative.    Endocrine: Negative.    Genitourinary: Negative.    Musculoskeletal: Negative.    Skin: Negative.    Allergic/Immunologic: Negative.    Neurological: Negative.    Hematological: Negative.    Psychiatric/Behavioral: Negative.         Physical Exam:  Physical Exam  Constitutional:       Appearance: He is normal weight.   HENT:      Head: Normocephalic.      Nose: Nose normal.      Mouth/Throat:      Mouth: Mucous membranes are moist.      Pharynx: Oropharynx is clear.   Eyes:      Extraocular Movements: Extraocular movements intact.      Conjunctiva/sclera: Conjunctivae normal.      Pupils: Pupils are equal, round, and reactive to light.   Cardiovascular:      Rate and Rhythm: Normal rate and regular rhythm.      Pulses: Normal pulses.      Heart sounds: Normal heart sounds.   Pulmonary:      Effort: Pulmonary effort is normal.      Breath sounds: Normal breath sounds.   Abdominal:      General: Abdomen is flat. Bowel sounds are normal.      Palpations: Abdomen is soft.      Hernia: There is no hernia in the right inguinal area or left inguinal area.   Genitourinary:     Penis: Normal. No phimosis, paraphimosis, hypospadias, erythema, tenderness or discharge.       Testes: Normal.         Right: Mass, tenderness or swelling not present. Right testis is descended. Cremasteric reflex is present.          Left: Mass, tenderness or swelling not present. Left testis is descended. Cremasteric reflex is present.       Prostate: Normal.      Rectum: Normal.   Musculoskeletal:         General: Normal range of motion.      Cervical back: Normal range of motion and neck supple.   Lymphadenopathy:      Lower Body: No right  inguinal adenopathy. No left inguinal adenopathy.   Skin:     General: Skin is warm and dry.      Capillary Refill: Capillary refill takes less than 2 seconds.   Neurological:      General: No focal deficit present.      Mental Status: He is alert and oriented to person, place, and time. Mental status is at baseline.   Psychiatric:         Mood and Affect: Mood normal.         Behavior: Behavior normal.         Thought Content: Thought content normal.         Judgment: Judgment normal.         Assessment/Plan:     Elevated PSA:  Patient's PSA has increased from 4.97 to 5.94.  He would a negative prostate biopsy in February of 2023.  We will have the patient return to clinic in 6 months to recheck PSA.  Discussed the possibility of prostate MRI if his PSA continues to increase.    I, Dr. Vitaliy Swenson have seen and personally evaluated the patient. I have formulated the plan reviewed all pertinent imaging and clinical data.  I agree with the nurse practitioner's assessment, and I have personally formulated the plan for this patient's care as described by the midlevel.   Problem List Items Addressed This Visit    None

## 2024-06-05 ENCOUNTER — CLINICAL SUPPORT (OUTPATIENT)
Dept: UROLOGY | Facility: CLINIC | Age: 69
End: 2024-06-05
Payer: MEDICARE

## 2024-06-05 DIAGNOSIS — R97.20 ELEVATED PSA: Primary | ICD-10-CM

## 2024-06-05 LAB — PSA, DIAGNOSTIC: 5.83 NG/ML (ref 0.1–4)

## 2024-06-05 PROCEDURE — 36415 COLL VENOUS BLD VENIPUNCTURE: CPT | Mod: S$GLB,,, | Performed by: UROLOGY

## 2024-06-05 NOTE — PROGRESS NOTES
Using aseptic technique, 22g straight stick to right a/c x1, blood collected into x 1 vial. 1x1 gauze and bandage applied to venipuncture site. Tolerated well. Verified scheduled appt, reminder copy given to pt.

## 2024-06-13 ENCOUNTER — OFFICE VISIT (OUTPATIENT)
Dept: UROLOGY | Facility: CLINIC | Age: 69
End: 2024-06-13
Payer: MEDICARE

## 2024-06-13 VITALS
DIASTOLIC BLOOD PRESSURE: 78 MMHG | WEIGHT: 300 LBS | OXYGEN SATURATION: 98 % | SYSTOLIC BLOOD PRESSURE: 130 MMHG | BODY MASS INDEX: 44.43 KG/M2 | HEART RATE: 80 BPM | HEIGHT: 69 IN

## 2024-06-13 DIAGNOSIS — R97.20 ELEVATED PSA: Primary | ICD-10-CM

## 2024-06-13 PROCEDURE — 99214 OFFICE O/P EST MOD 30 MIN: CPT | Mod: S$GLB,,, | Performed by: UROLOGY

## 2024-06-13 NOTE — PROGRESS NOTES
Subjective:       Patient ID: Evens Briseno is a 69 y.o. male.    Chief Complaint: No chief complaint on file.      HPI: 69-year-old male patient presenting to the clinic to follow up for elevated PSA.  Patient's most recent PSA on 06/05/2024 was 583.  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  Patient currently taking Flomax 0.4 mg daily.  He denies any issues at this visit.    06/05/2024   5.83  12/04/2023   5.94  05/29/2023   4.97  11/29/2022   4.85     Past Medical History:   Past Medical History:   Diagnosis Date    BPH with obstruction/lower urinary tract symptoms     Elevated PSA     HLD (hyperlipidemia)     HTN (hypertension)     MI (myocardial infarction)     Skin cancer        Past Surgical Historical:   Past Surgical History:   Procedure Laterality Date    PERIPHERAL ARTERIAL STENT GRAFT      WISDOM TOOTH EXTRACTION          Medications:   Medication List with Changes/Refills   Current Medications    AMLODIPINE (NORVASC) 10 MG TABLET        ASPIRIN (ECOTRIN) 81 MG EC TABLET    Take 81 mg by mouth once daily.    DIAZEPAM (VALIUM) 10 MG TAB    TAKE 1 TABLET PO 30 MINUTES PRIOR TO ARRIVAL FOR PROCEDURE    FUROSEMIDE (LASIX) 20 MG TABLET    Take 20 mg by mouth once daily.    HYDROXYCHLOROQUINE (PLAQUENIL) 200 MG TABLET    Take 200 mg by mouth 2 (two) times daily.    LOSARTAN (COZAAR) 100 MG TABLET    Take 100 mg by mouth once daily.    METOPROLOL TARTRATE (LOPRESSOR) 25 MG TABLET    Take 25 mg by mouth 2 (two) times daily.    ROSUVASTATIN (CRESTOR) 40 MG TAB    Take 40 mg by mouth every evening.    TAMSULOSIN (FLOMAX) 0.4 MG CAP    TAKE 1 CAPSULE BY MOUTH ONCE A DAY TAKE 1 CAPSULE BY MOUTH EVERY DAY        Past Social History:   Social History     Socioeconomic History    Marital status: Unknown   Tobacco Use    Smoking status: Every Day     Types: Cigarettes    Smokeless tobacco: Never   Substance and Sexual Activity    Alcohol use: Not  Currently       Allergies: Review of patient's allergies indicates:  No Known Allergies     Family History: No family history on file.     Review of Systems:  Review of Systems   Constitutional: Negative.    HENT: Negative.     Eyes: Negative.    Respiratory: Negative.     Cardiovascular: Negative.    Gastrointestinal: Negative.    Endocrine: Negative.    Genitourinary: Negative.    Musculoskeletal: Negative.    Skin: Negative.    Allergic/Immunologic: Negative.    Neurological: Negative.    Hematological: Negative.    Psychiatric/Behavioral: Negative.         Physical Exam:  Physical Exam  Constitutional:       Appearance: He is normal weight.   HENT:      Head: Normocephalic.      Nose: Nose normal.      Mouth/Throat:      Mouth: Mucous membranes are moist.      Pharynx: Oropharynx is clear.   Eyes:      Extraocular Movements: Extraocular movements intact.      Conjunctiva/sclera: Conjunctivae normal.      Pupils: Pupils are equal, round, and reactive to light.   Cardiovascular:      Rate and Rhythm: Normal rate and regular rhythm.      Pulses: Normal pulses.      Heart sounds: Normal heart sounds.   Pulmonary:      Effort: Pulmonary effort is normal.      Breath sounds: Normal breath sounds.   Abdominal:      General: Abdomen is flat. Bowel sounds are normal.      Palpations: Abdomen is soft.      Hernia: There is no hernia in the right inguinal area or left inguinal area.   Genitourinary:     Penis: Normal. No phimosis, paraphimosis, hypospadias, erythema, tenderness or discharge.       Testes: Normal.         Right: Mass, tenderness or swelling not present. Right testis is descended. Cremasteric reflex is present.          Left: Mass, tenderness or swelling not present. Left testis is descended. Cremasteric reflex is present.       Prostate: Normal.      Rectum: Normal.   Musculoskeletal:         General: Normal range of motion.      Cervical back: Normal range of motion and neck supple.   Lymphadenopathy:       Lower Body: No right inguinal adenopathy. No left inguinal adenopathy.   Skin:     General: Skin is warm and dry.      Capillary Refill: Capillary refill takes less than 2 seconds.   Neurological:      General: No focal deficit present.      Mental Status: He is alert and oriented to person, place, and time. Mental status is at baseline.   Psychiatric:         Mood and Affect: Mood normal.         Behavior: Behavior normal.         Thought Content: Thought content normal.         Judgment: Judgment normal.       Assessment/Plan:     Elevated PSA:  Patient's PSA was 5.83. He had a negative prostate biopsy in February of 2023.  We will have the patient return to clinic in 9 months to recheck PSA.  Discussed the possibility of prostate MRI if his PSA continues to increase.    I, Dr. Vitaliy Swenson have seen and personally evaluated the patient. I have formulated the plan reviewed all pertinent imaging and clinical data.  I agree with the nurse practitioner's assessment, and I have personally formulated the plan for this patient's care as described by the midlevel.   Problem List Items Addressed This Visit    None

## 2025-02-28 DIAGNOSIS — R97.20 ELEVATED PSA: Primary | ICD-10-CM

## 2025-03-17 ENCOUNTER — TELEPHONE (OUTPATIENT)
Dept: UROLOGY | Facility: CLINIC | Age: 70
End: 2025-03-17
Payer: MEDICARE

## 2025-03-17 NOTE — TELEPHONE ENCOUNTER
Spoke with pt and requested his psa lab work. Pt states he had done at [facility]. I called and a psa was not done. I called pt and asked him to call his  To have this test added on and sent to us.    ----- Message from Maria Ines sent at 3/17/2025 10:14 AM CDT -----  Contact: self  Type:  Patient Returning CallWho Called:Evens Roblero Left Message for Patient:unsureDoes the patient know what this is regarding?:lab results updateWould the patient rather a call back or a response via [facility]? Be Call Back Number:[phone] Information: n/a

## 2025-03-18 ENCOUNTER — OFFICE VISIT (OUTPATIENT)
Dept: UROLOGY | Facility: CLINIC | Age: 70
End: 2025-03-18
Payer: MEDICARE

## 2025-03-18 VITALS
WEIGHT: 300.06 LBS | HEART RATE: 71 BPM | SYSTOLIC BLOOD PRESSURE: 140 MMHG | HEIGHT: 69 IN | RESPIRATION RATE: 19 BRPM | DIASTOLIC BLOOD PRESSURE: 71 MMHG | BODY MASS INDEX: 44.44 KG/M2

## 2025-03-18 DIAGNOSIS — R97.20 ELEVATED PSA: Primary | ICD-10-CM

## 2025-03-18 PROCEDURE — 99214 OFFICE O/P EST MOD 30 MIN: CPT | Mod: S$PBB,,, | Performed by: UROLOGY

## 2025-03-18 NOTE — PROGRESS NOTES
Subjective:       Patient ID: Evens Briseno is a 69 y.o. male.    Chief Complaint: psa F/U      HPI: 69-year-old male patient presenting to the clinic to follow up for elevated PSA.  Patient's most recent PSA on 10/03/2024 was 7.71.  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  Patient currently taking Flomax 0.4 mg daily.  He denies any issues at this visit.    10/03/2024   7.71  06/05/2024   5.83  12/04/2023   5.94  05/29/2023   4.97  11/29/2022   4.85       Past Medical History:   Past Medical History:   Diagnosis Date    BPH with obstruction/lower urinary tract symptoms     Elevated PSA     HLD (hyperlipidemia)     HTN (hypertension)     MI (myocardial infarction)     Skin cancer        Past Surgical Historical:   Past Surgical History:   Procedure Laterality Date    PERIPHERAL ARTERIAL STENT GRAFT      WISDOM TOOTH EXTRACTION          Medications:   Medication List with Changes/Refills   Current Medications    AMLODIPINE (NORVASC) 10 MG TABLET        ASPIRIN (ECOTRIN) 81 MG EC TABLET    Take 81 mg by mouth once daily.    FUROSEMIDE (LASIX) 20 MG TABLET    Take 20 mg by mouth once daily.    HYDROXYCHLOROQUINE (PLAQUENIL) 200 MG TABLET    Take 200 mg by mouth 2 (two) times daily.    LOSARTAN (COZAAR) 100 MG TABLET    Take 100 mg by mouth once daily.    METOPROLOL TARTRATE (LOPRESSOR) 25 MG TABLET    Take 25 mg by mouth 2 (two) times daily.    ROSUVASTATIN (CRESTOR) 40 MG TAB    Take 40 mg by mouth every evening.    TAMSULOSIN (FLOMAX) 0.4 MG CAP    TAKE 1 CAPSULE BY MOUTH ONCE A DAY TAKE 1 CAPSULE BY MOUTH EVERY DAY        Past Social History:   Social History     Socioeconomic History    Marital status: Unknown   Tobacco Use    Smoking status: Every Day     Types: Cigarettes    Smokeless tobacco: Never   Substance and Sexual Activity    Alcohol use: Not Currently       Allergies: Review of patient's allergies indicates:  No Known Allergies     Family History:  No family history on file.     Review of Systems:  Review of Systems   Constitutional: Negative.    HENT: Negative.     Eyes: Negative.    Respiratory: Negative.     Cardiovascular: Negative.    Gastrointestinal: Negative.    Endocrine: Negative.    Genitourinary: Negative.    Musculoskeletal: Negative.    Skin: Negative.    Allergic/Immunologic: Negative.    Neurological: Negative.    Hematological: Negative.    Psychiatric/Behavioral: Negative.         Physical Exam:  Physical Exam  Constitutional:       Appearance: He is normal weight.   HENT:      Head: Normocephalic.      Nose: Nose normal.      Mouth/Throat:      Mouth: Mucous membranes are moist.      Pharynx: Oropharynx is clear.   Eyes:      Extraocular Movements: Extraocular movements intact.      Conjunctiva/sclera: Conjunctivae normal.      Pupils: Pupils are equal, round, and reactive to light.   Cardiovascular:      Rate and Rhythm: Normal rate and regular rhythm.      Pulses: Normal pulses.      Heart sounds: Normal heart sounds.   Pulmonary:      Effort: Pulmonary effort is normal.      Breath sounds: Normal breath sounds.   Abdominal:      General: Abdomen is flat. Bowel sounds are normal.      Palpations: Abdomen is soft.      Hernia: There is no hernia in the right inguinal area or left inguinal area.   Genitourinary:     Penis: Normal. No phimosis, paraphimosis, hypospadias, erythema, tenderness or discharge.       Testes: Normal.         Right: Mass, tenderness or swelling not present. Right testis is descended. Cremasteric reflex is present.          Left: Mass, tenderness or swelling not present. Left testis is descended. Cremasteric reflex is present.       Prostate: Normal.      Rectum: Normal.   Musculoskeletal:         General: Normal range of motion.      Cervical back: Normal range of motion and neck supple.   Lymphadenopathy:      Lower Body: No right inguinal adenopathy. No left inguinal adenopathy.   Skin:     General: Skin is warm  and dry.      Capillary Refill: Capillary refill takes less than 2 seconds.   Neurological:      General: No focal deficit present.      Mental Status: He is alert and oriented to person, place, and time. Mental status is at baseline.   Psychiatric:         Mood and Affect: Mood normal.         Behavior: Behavior normal.         Thought Content: Thought content normal.         Judgment: Judgment normal.         Assessment/Plan:     Elevated PSA:  He has not had a recent PSA.  We will draw his PSA at this visit and notify him of results when they are seen.  He had a negative prostate biopsy in February of 2023.  We will have the patient return to clinic in 9 months to recheck PSA.  Discussed the possibility of prostate MRI if his PSA continues to increase.    I, Dr. Vitaliy Swenson have seen and personally evaluated the patient. I have formulated the plan reviewed all pertinent imaging and clinical data.  I agree with the nurse practitioner's assessment, and I have personally formulated the plan for this patient's care as described by the midlevel.   Problem List Items Addressed This Visit    None  Visit Diagnoses         Elevated PSA    -  Primary    Relevant Orders    Prostate Specific Antigen, Diagnostic

## 2025-03-18 NOTE — PROGRESS NOTES
Using aseptic technique, straight stick to R. A/C x1 attempt with success. 1x1 gauze with bandage applied. Tolerated well.

## 2025-04-02 DIAGNOSIS — R97.20 ELEVATED PSA: Primary | ICD-10-CM

## 2025-04-15 ENCOUNTER — RESULTS FOLLOW-UP (OUTPATIENT)
Dept: UROLOGY | Facility: CLINIC | Age: 70
End: 2025-04-15

## 2025-04-15 ENCOUNTER — TELEPHONE (OUTPATIENT)
Dept: UROLOGY | Facility: CLINIC | Age: 70
End: 2025-04-15
Payer: MEDICARE

## 2025-04-15 NOTE — TELEPHONE ENCOUNTER
Spoke with pt in regards to MRI results. Pt verbalized understanding. Confirmed scheduled appts in 6 mos.    ----- Message from Vitaliy Swenson MD sent at 4/15/2025  8:11 AM CDT -----  No worrisome lesions on MRI patient can follow-up as scheduled in 6 months  ----- Message -----  From: Pebbles Lobo  Sent: 4/15/2025   8:07 AM CDT  To: Vitaliy Swenson MD

## 2025-08-23 ENCOUNTER — PATIENT MESSAGE (OUTPATIENT)
Dept: RESEARCH | Facility: HOSPITAL | Age: 70
End: 2025-08-23
Payer: MEDICARE